# Patient Record
Sex: MALE | Race: WHITE | NOT HISPANIC OR LATINO | URBAN - METROPOLITAN AREA
[De-identification: names, ages, dates, MRNs, and addresses within clinical notes are randomized per-mention and may not be internally consistent; named-entity substitution may affect disease eponyms.]

---

## 2019-08-05 ENCOUNTER — OFFICE VISIT (OUTPATIENT)
Dept: URBAN - METROPOLITAN AREA CLINIC 71 | Facility: CLINIC | Age: 69
End: 2019-08-05
Payer: MEDICARE

## 2019-08-05 DIAGNOSIS — E11.9 TYPE 2 DIABETES MELLITUS W/O COMPLICATION: Primary | ICD-10-CM

## 2019-08-05 PROCEDURE — 92014 COMPRE OPH EXAM EST PT 1/>: CPT | Performed by: OPTOMETRIST

## 2019-08-05 ASSESSMENT — INTRAOCULAR PRESSURE
OS: 15
OD: 15

## 2019-08-05 NOTE — IMPRESSION/PLAN
Impression: Diagnosis: Presence of intraocular lens. Code: Z96.1. OU. Mild PCO OU. Asymptomatic. Plan: Continue to monitor. Call if worsens.

## 2019-08-05 NOTE — IMPRESSION/PLAN
Impression: Diagnosis: Type 2 diabetes mellitus w/o complication. Code: E11.9. Plan: DM: Discussed. No diabetic retinopathy. Discussed ocular and systemic benefits of blood sugar control. Patient was instructed to monitor vision for changes and to call if noted.

## 2020-09-01 ENCOUNTER — OFFICE VISIT (OUTPATIENT)
Dept: URBAN - METROPOLITAN AREA CLINIC 72 | Facility: CLINIC | Age: 70
End: 2020-09-01
Payer: MEDICARE

## 2020-09-01 DIAGNOSIS — H43.813 VITREOUS DEGENERATION, BILATERAL: ICD-10-CM

## 2020-09-01 DIAGNOSIS — Z96.1 PRESENCE OF INTRAOCULAR LENS: ICD-10-CM

## 2020-09-01 PROCEDURE — 92014 COMPRE OPH EXAM EST PT 1/>: CPT | Performed by: OPTOMETRIST

## 2020-09-01 ASSESSMENT — INTRAOCULAR PRESSURE
OS: 9
OD: 14

## 2020-09-01 NOTE — IMPRESSION/PLAN
Impression: Diagnosis: Presence of intraocular lens. Code: Z96.1. OU. Mild PCO OU. Asymptomatic. Plan: Monitor. Call if worsens.

## 2020-09-01 NOTE — IMPRESSION/PLAN
Impression: Diagnosis: Type 2 diabetes mellitus w/o complication. Code: E11.9. Plan: Discussed DM in detail. No diabetic retinopathy. Discussed ocular and systemic benefits of blood sugar control. Patient was instructed to monitor vision for changes and to call if noted.

## 2021-09-16 ENCOUNTER — OFFICE VISIT (OUTPATIENT)
Dept: URBAN - METROPOLITAN AREA CLINIC 72 | Facility: CLINIC | Age: 71
End: 2021-09-16
Payer: COMMERCIAL

## 2021-09-16 DIAGNOSIS — H04.123 DRY EYE SYNDROME OF BILATERAL LACRIMAL GLANDS: ICD-10-CM

## 2021-09-16 DIAGNOSIS — H26.492 OTHER SECONDARY CATARACT, LEFT EYE: ICD-10-CM

## 2021-09-16 PROCEDURE — 99213 OFFICE O/P EST LOW 20 MIN: CPT | Performed by: OPTOMETRIST

## 2021-09-16 ASSESSMENT — INTRAOCULAR PRESSURE
OS: 15
OD: 14

## 2021-09-16 NOTE — IMPRESSION/PLAN
Impression: Type 2 diabetes mellitus w/o complication: O45.9. Plan: Diabetes type II: no background retinopathy, no signs of neovascularization noted. Discussed ocular and systemic benefits of blood sugar control.

## 2021-09-16 NOTE — IMPRESSION/PLAN
Impression: Other secondary cataract, left eye: H26.492. Plan: Discussed diagnosis in detail with patient. Discussed treatment options, YAG PC, with patient, though no treatment is required, or recommended at this time.  Will continue to monitor for changes

## 2022-09-27 ENCOUNTER — OFFICE VISIT (OUTPATIENT)
Dept: URBAN - METROPOLITAN AREA CLINIC 72 | Facility: CLINIC | Age: 72
End: 2022-09-27
Payer: MEDICARE

## 2022-09-27 DIAGNOSIS — H43.813 VITREOUS DEGENERATION, BILATERAL: ICD-10-CM

## 2022-09-27 DIAGNOSIS — E11.9 TYPE 2 DIABETES MELLITUS W/O COMPLICATION: Primary | ICD-10-CM

## 2022-09-27 DIAGNOSIS — H26.493 OTHER SECONDARY CATARACT, BILATERAL: ICD-10-CM

## 2022-09-27 DIAGNOSIS — H35.373 PUCKERING OF MACULA, BILATERAL: ICD-10-CM

## 2022-09-27 PROCEDURE — 92134 CPTRZ OPH DX IMG PST SGM RTA: CPT | Performed by: OPTOMETRIST

## 2022-09-27 PROCEDURE — 99213 OFFICE O/P EST LOW 20 MIN: CPT | Performed by: OPTOMETRIST

## 2022-09-27 ASSESSMENT — INTRAOCULAR PRESSURE
OD: 15
OS: 14

## 2022-09-27 NOTE — IMPRESSION/PLAN
Impression: Puckering of macula, bilateral: H35.373. OCT ordered and done today ERM, trace OU Plan: Discussed today's testing and findings with pt. Educated pt on Macular Pucker and discussed treatment options, including SX, though none is recommended or required at this time.  Will monitor for changes yearly with DFE and OCT

## 2022-09-27 NOTE — IMPRESSION/PLAN
Impression: Type 2 diabetes mellitus w/o complication: P75.0. Plan: Diabetes type II: no background retinopathy, no signs of neovascularization noted. Discussed ocular and systemic benefits of blood sugar control.

## 2023-11-07 ENCOUNTER — OFFICE VISIT (OUTPATIENT)
Dept: URBAN - METROPOLITAN AREA CLINIC 72 | Facility: CLINIC | Age: 73
End: 2023-11-07
Payer: MEDICARE

## 2023-11-07 DIAGNOSIS — E11.9 TYPE 2 DIABETES MELLITUS W/O COMPLICATION: Primary | ICD-10-CM

## 2023-11-07 DIAGNOSIS — H35.373 PUCKERING OF MACULA, BILATERAL: ICD-10-CM

## 2023-11-07 DIAGNOSIS — H43.813 VITREOUS DEGENERATION, BILATERAL: ICD-10-CM

## 2023-11-07 PROCEDURE — 99213 OFFICE O/P EST LOW 20 MIN: CPT | Performed by: OPTOMETRIST

## 2023-11-07 ASSESSMENT — INTRAOCULAR PRESSURE
OD: 15
OS: 15

## 2025-01-09 ENCOUNTER — OFFICE VISIT (OUTPATIENT)
Dept: URBAN - METROPOLITAN AREA CLINIC 72 | Facility: CLINIC | Age: 75
End: 2025-01-09
Payer: MEDICARE

## 2025-01-09 DIAGNOSIS — H26.493 OTHER SECONDARY CATARACT, BILATERAL: ICD-10-CM

## 2025-01-09 DIAGNOSIS — H35.373 PUCKERING OF MACULA, BILATERAL: ICD-10-CM

## 2025-01-09 DIAGNOSIS — E10.3291 DIABETES MELLITUS TYPE 1 WITH MILD NON-PROLIFERATIVE RETINOPATHY WITH MACULAR EDEMA, RIGHT EYE: Primary | ICD-10-CM

## 2025-01-09 PROCEDURE — 99213 OFFICE O/P EST LOW 20 MIN: CPT | Performed by: OPTOMETRIST

## 2025-01-09 PROCEDURE — 92134 CPTRZ OPH DX IMG PST SGM RTA: CPT | Performed by: OPTOMETRIST

## 2025-01-09 ASSESSMENT — INTRAOCULAR PRESSURE
OD: 16
OS: 18